# Patient Record
Sex: MALE | Race: WHITE | NOT HISPANIC OR LATINO | Employment: OTHER | URBAN - METROPOLITAN AREA
[De-identification: names, ages, dates, MRNs, and addresses within clinical notes are randomized per-mention and may not be internally consistent; named-entity substitution may affect disease eponyms.]

---

## 2023-12-19 ENCOUNTER — HOSPITAL ENCOUNTER (OUTPATIENT)
Dept: RADIOLOGY | Facility: HOSPITAL | Age: 68
Discharge: HOME | End: 2023-12-19
Payer: MEDICARE

## 2023-12-19 ENCOUNTER — LAB (OUTPATIENT)
Dept: LAB | Facility: LAB | Age: 68
End: 2023-12-19
Payer: MEDICARE

## 2023-12-19 DIAGNOSIS — K21.9 GASTRO-ESOPHAGEAL REFLUX DISEASE WITHOUT ESOPHAGITIS: ICD-10-CM

## 2023-12-19 DIAGNOSIS — E55.9 VITAMIN D DEFICIENCY, UNSPECIFIED: ICD-10-CM

## 2023-12-19 DIAGNOSIS — R05.2 SUBACUTE COUGH: ICD-10-CM

## 2023-12-19 DIAGNOSIS — E78.5 HYPERLIPIDEMIA, UNSPECIFIED: Primary | ICD-10-CM

## 2023-12-19 DIAGNOSIS — Z12.5 ENCOUNTER FOR SCREENING FOR MALIGNANT NEOPLASM OF PROSTATE: ICD-10-CM

## 2023-12-19 DIAGNOSIS — N40.0 BENIGN PROSTATIC HYPERPLASIA WITHOUT LOWER URINARY TRACT SYMPTOMS: ICD-10-CM

## 2023-12-19 LAB
ALBUMIN SERPL BCP-MCNC: 4.5 G/DL (ref 3.4–5)
ALP SERPL-CCNC: 78 U/L (ref 33–136)
ALT SERPL W P-5'-P-CCNC: 37 U/L (ref 10–52)
ANION GAP SERPL CALC-SCNC: 13 MMOL/L (ref 10–20)
APPEARANCE UR: CLEAR
AST SERPL W P-5'-P-CCNC: 25 U/L (ref 9–39)
BASOPHILS # BLD AUTO: 0.08 X10*3/UL (ref 0–0.1)
BASOPHILS NFR BLD AUTO: 0.9 %
BILIRUB DIRECT SERPL-MCNC: 0.1 MG/DL (ref 0–0.3)
BILIRUB SERPL-MCNC: 0.5 MG/DL (ref 0–1.2)
BILIRUB UR STRIP.AUTO-MCNC: NEGATIVE MG/DL
BUN SERPL-MCNC: 14 MG/DL (ref 6–23)
CALCIUM SERPL-MCNC: 9.6 MG/DL (ref 8.6–10.3)
CHLORIDE SERPL-SCNC: 104 MMOL/L (ref 98–107)
CHOLEST SERPL-MCNC: 261 MG/DL (ref 0–199)
CHOLESTEROL/HDL RATIO: 5.1
CO2 SERPL-SCNC: 28 MMOL/L (ref 21–32)
COLOR UR: YELLOW
CREAT SERPL-MCNC: 1.07 MG/DL (ref 0.5–1.3)
EOSINOPHIL # BLD AUTO: 0.08 X10*3/UL (ref 0–0.7)
EOSINOPHIL NFR BLD AUTO: 0.9 %
ERYTHROCYTE [DISTWIDTH] IN BLOOD BY AUTOMATED COUNT: 13 % (ref 11.5–14.5)
GFR SERPL CREATININE-BSD FRML MDRD: 77 ML/MIN/1.73M*2
GLUCOSE SERPL-MCNC: 90 MG/DL (ref 74–99)
GLUCOSE UR STRIP.AUTO-MCNC: NEGATIVE MG/DL
HCT VFR BLD AUTO: 47.8 % (ref 41–52)
HDLC SERPL-MCNC: 50.8 MG/DL
HGB BLD-MCNC: 16.3 G/DL (ref 13.5–17.5)
IMM GRANULOCYTES # BLD AUTO: 0.02 X10*3/UL (ref 0–0.7)
IMM GRANULOCYTES NFR BLD AUTO: 0.2 % (ref 0–0.9)
KETONES UR STRIP.AUTO-MCNC: NEGATIVE MG/DL
LDLC SERPL CALC-MCNC: 157 MG/DL
LEUKOCYTE ESTERASE UR QL STRIP.AUTO: NEGATIVE
LYMPHOCYTES # BLD AUTO: 2.11 X10*3/UL (ref 1.2–4.8)
LYMPHOCYTES NFR BLD AUTO: 23.9 %
MCH RBC QN AUTO: 33.5 PG (ref 26–34)
MCHC RBC AUTO-ENTMCNC: 34.1 G/DL (ref 32–36)
MCV RBC AUTO: 98 FL (ref 80–100)
MONOCYTES # BLD AUTO: 0.52 X10*3/UL (ref 0.1–1)
MONOCYTES NFR BLD AUTO: 5.9 %
NEUTROPHILS # BLD AUTO: 6.01 X10*3/UL (ref 1.2–7.7)
NEUTROPHILS NFR BLD AUTO: 68.2 %
NITRITE UR QL STRIP.AUTO: NEGATIVE
NON HDL CHOLESTEROL: 210 MG/DL (ref 0–149)
NRBC BLD-RTO: 0 /100 WBCS (ref 0–0)
PH UR STRIP.AUTO: 5 [PH]
PLATELET # BLD AUTO: 324 X10*3/UL (ref 150–450)
POTASSIUM SERPL-SCNC: 4.7 MMOL/L (ref 3.5–5.3)
PROT SERPL-MCNC: 7.2 G/DL (ref 6.4–8.2)
PROT UR STRIP.AUTO-MCNC: NEGATIVE MG/DL
RBC # BLD AUTO: 4.87 X10*6/UL (ref 4.5–5.9)
RBC # UR STRIP.AUTO: NEGATIVE /UL
SODIUM SERPL-SCNC: 140 MMOL/L (ref 136–145)
SP GR UR STRIP.AUTO: 1.01
TRIGL SERPL-MCNC: 265 MG/DL (ref 0–149)
TSH SERPL-ACNC: 0.94 MIU/L (ref 0.44–3.98)
UROBILINOGEN UR STRIP.AUTO-MCNC: <2 MG/DL
VLDL: 53 MG/DL (ref 0–40)
WBC # BLD AUTO: 8.8 X10*3/UL (ref 4.4–11.3)

## 2023-12-19 PROCEDURE — 84443 ASSAY THYROID STIM HORMONE: CPT

## 2023-12-19 PROCEDURE — 85025 COMPLETE CBC W/AUTO DIFF WBC: CPT

## 2023-12-19 PROCEDURE — 36415 COLL VENOUS BLD VENIPUNCTURE: CPT

## 2023-12-19 PROCEDURE — 84154 ASSAY OF PSA FREE: CPT

## 2023-12-19 PROCEDURE — 82248 BILIRUBIN DIRECT: CPT

## 2023-12-19 PROCEDURE — 71046 X-RAY EXAM CHEST 2 VIEWS: CPT | Performed by: RADIOLOGY

## 2023-12-19 PROCEDURE — 84153 ASSAY OF PSA TOTAL: CPT

## 2023-12-19 PROCEDURE — 80053 COMPREHEN METABOLIC PANEL: CPT

## 2023-12-19 PROCEDURE — 71046 X-RAY EXAM CHEST 2 VIEWS: CPT

## 2023-12-19 PROCEDURE — 80061 LIPID PANEL: CPT

## 2023-12-19 PROCEDURE — 82306 VITAMIN D 25 HYDROXY: CPT

## 2023-12-19 PROCEDURE — 81003 URINALYSIS AUTO W/O SCOPE: CPT

## 2023-12-19 PROCEDURE — 83036 HEMOGLOBIN GLYCOSYLATED A1C: CPT

## 2023-12-20 LAB
25(OH)D3 SERPL-MCNC: 21 NG/ML (ref 30–100)
EST. AVERAGE GLUCOSE BLD GHB EST-MCNC: 120 MG/DL
HBA1C MFR BLD: 5.8 %

## 2023-12-21 LAB
PSA FREE MFR SERPL: 12 %
PSA FREE SERPL-MCNC: 0.3 NG/ML
PSA SERPL IA-MCNC: 2.5 NG/ML (ref 0–4)

## 2024-01-17 ENCOUNTER — LAB (OUTPATIENT)
Dept: LAB | Facility: LAB | Age: 69
End: 2024-01-17
Payer: MEDICARE

## 2024-01-17 DIAGNOSIS — I10 ESSENTIAL (PRIMARY) HYPERTENSION: Primary | ICD-10-CM

## 2024-01-17 LAB — POTASSIUM SERPL-SCNC: 5.1 MMOL/L (ref 3.5–5.3)

## 2024-01-17 PROCEDURE — 84132 ASSAY OF SERUM POTASSIUM: CPT

## 2024-01-17 PROCEDURE — 36415 COLL VENOUS BLD VENIPUNCTURE: CPT

## 2024-03-25 ENCOUNTER — HOSPITAL ENCOUNTER (OUTPATIENT)
Dept: RADIOLOGY | Facility: CLINIC | Age: 69
Discharge: HOME | End: 2024-03-25
Payer: MEDICARE

## 2024-03-25 DIAGNOSIS — F17.200 NICOTINE DEPENDENCE, UNSPECIFIED, UNCOMPLICATED: ICD-10-CM

## 2024-03-25 PROCEDURE — 71271 CT THORAX LUNG CANCER SCR C-: CPT

## 2024-03-25 PROCEDURE — 71271 CT THORAX LUNG CANCER SCR C-: CPT | Performed by: RADIOLOGY

## 2024-04-12 ENCOUNTER — OFFICE VISIT (OUTPATIENT)
Dept: CARDIOLOGY | Facility: CLINIC | Age: 69
End: 2024-04-12
Payer: MEDICARE

## 2024-04-12 ENCOUNTER — TELEPHONE (OUTPATIENT)
Dept: CARDIOLOGY | Facility: CLINIC | Age: 69
End: 2024-04-12

## 2024-04-12 VITALS
HEIGHT: 67 IN | BODY MASS INDEX: 31.71 KG/M2 | HEART RATE: 97 BPM | OXYGEN SATURATION: 98 % | WEIGHT: 202 LBS | DIASTOLIC BLOOD PRESSURE: 98 MMHG | SYSTOLIC BLOOD PRESSURE: 171 MMHG

## 2024-04-12 DIAGNOSIS — I73.9 CLAUDICATION (CMS-HCC): ICD-10-CM

## 2024-04-12 DIAGNOSIS — I10 PRIMARY HYPERTENSION: Chronic | ICD-10-CM

## 2024-04-12 DIAGNOSIS — I25.10 CORONARY ARTERY DISEASE INVOLVING NATIVE CORONARY ARTERY OF NATIVE HEART WITHOUT ANGINA PECTORIS: Primary | Chronic | ICD-10-CM

## 2024-04-12 DIAGNOSIS — E78.00 HYPERCHOLESTEREMIA: Chronic | ICD-10-CM

## 2024-04-12 PROBLEM — G56.01 CARPAL TUNNEL SYNDROME OF RIGHT WRIST: Status: ACTIVE | Noted: 2024-04-12

## 2024-04-12 PROBLEM — K21.9 GERD (GASTROESOPHAGEAL REFLUX DISEASE): Status: ACTIVE | Noted: 2023-12-19

## 2024-04-12 PROBLEM — F17.200 SMOKER: Status: ACTIVE | Noted: 2023-12-19

## 2024-04-12 PROBLEM — G54.2 LESION OF RIGHT CERVICAL NERVE ROOT: Status: ACTIVE | Noted: 2024-04-12

## 2024-04-12 PROBLEM — K63.5 COLON POLYP: Status: ACTIVE | Noted: 2023-12-19

## 2024-04-12 PROBLEM — R05.2 SUBACUTE COUGH: Status: ACTIVE | Noted: 2023-12-19

## 2024-04-12 PROBLEM — E78.5 HYPERLIPIDEMIA: Status: ACTIVE | Noted: 2023-12-19

## 2024-04-12 PROBLEM — M25.531 RIGHT WRIST PAIN: Status: ACTIVE | Noted: 2024-04-12

## 2024-04-12 PROBLEM — E55.9 VITAMIN D DEFICIENCY: Status: ACTIVE | Noted: 2023-12-19

## 2024-04-12 PROBLEM — N40.0 BENIGN PROSTATIC HYPERPLASIA: Status: ACTIVE | Noted: 2023-12-19

## 2024-04-12 PROCEDURE — 99214 OFFICE O/P EST MOD 30 MIN: CPT | Performed by: INTERNAL MEDICINE

## 2024-04-12 PROCEDURE — 99204 OFFICE O/P NEW MOD 45 MIN: CPT | Performed by: INTERNAL MEDICINE

## 2024-04-12 PROCEDURE — 1159F MED LIST DOCD IN RCRD: CPT | Performed by: INTERNAL MEDICINE

## 2024-04-12 PROCEDURE — 3077F SYST BP >= 140 MM HG: CPT | Performed by: INTERNAL MEDICINE

## 2024-04-12 PROCEDURE — 1160F RVW MEDS BY RX/DR IN RCRD: CPT | Performed by: INTERNAL MEDICINE

## 2024-04-12 PROCEDURE — 93010 ELECTROCARDIOGRAM REPORT: CPT | Performed by: INTERNAL MEDICINE

## 2024-04-12 PROCEDURE — 3080F DIAST BP >= 90 MM HG: CPT | Performed by: INTERNAL MEDICINE

## 2024-04-12 PROCEDURE — 93005 ELECTROCARDIOGRAM TRACING: CPT | Performed by: INTERNAL MEDICINE

## 2024-04-12 RX ORDER — LANOLIN ALCOHOL/MO/W.PET/CERES
1000 CREAM (GRAM) TOPICAL DAILY
COMMUNITY

## 2024-04-12 RX ORDER — NAPROXEN 500 MG/1
500 TABLET ORAL
COMMUNITY
Start: 2024-04-11

## 2024-04-12 RX ORDER — ATORVASTATIN CALCIUM 40 MG/1
40 TABLET, FILM COATED ORAL DAILY
COMMUNITY
End: 2024-04-17 | Stop reason: SDUPTHER

## 2024-04-12 RX ORDER — HYDROCHLOROTHIAZIDE 25 MG/1
25 TABLET ORAL
COMMUNITY
Start: 2024-01-17

## 2024-04-12 RX ORDER — LOSARTAN POTASSIUM 50 MG/1
50 TABLET ORAL DAILY
COMMUNITY

## 2024-04-12 RX ORDER — AMLODIPINE BESYLATE 10 MG/1
10 TABLET ORAL
COMMUNITY
Start: 2024-01-17

## 2024-04-12 RX ORDER — ROSUVASTATIN CALCIUM 20 MG/1
20 TABLET, COATED ORAL
COMMUNITY
Start: 2024-03-18 | End: 2024-04-12

## 2024-04-12 RX ORDER — PANTOPRAZOLE SODIUM 20 MG/1
20 TABLET, DELAYED RELEASE ORAL DAILY
COMMUNITY
Start: 2024-02-15

## 2024-04-12 RX ORDER — ASPIRIN 81 MG/1
81 TABLET ORAL
COMMUNITY

## 2024-04-12 RX ORDER — ACETAMINOPHEN 500 MG
2000 TABLET ORAL
COMMUNITY
Start: 2012-11-20

## 2024-04-12 RX ORDER — LOSARTAN POTASSIUM 25 MG/1
50 TABLET ORAL
COMMUNITY
Start: 2024-03-19 | End: 2024-04-12 | Stop reason: ALTCHOICE

## 2024-04-12 NOTE — PROGRESS NOTES
Referred by No ref. provider found    HPI I am seeing the patient for evaluation of hypertension and coronary disease.  He is 69.  He speaks predominantly Polish but understands without any difficulty.  He works in construction.  He has no symptoms of chest pain or pressure no significant shortness of breath.  He does smoke and is down to 2 cigarettes a day.  Blood pressures have been quite high.  He went to the emergency room sometime ago because he was drinking very heavily with his friend and his blood pressure was 200.  His blood pressure is now in the morning or in the 120s but by the evening when he comes back from work it is in the 150s.  No palpitations no dizziness no lightheadedness.  He did have a calcium score that came back elevated at 46 units.  His echo from 3/22/2024 revealed normal LV function.    Past Medical History:  Problem List Items Addressed This Visit    None       No past medical history on file.          Past Surgical History:  He has no past surgical history on file.      Social History:  He has no history on file for tobacco use, alcohol use, and drug use.    Family History:  No family history on file.     Allergies:  Patient has no allergy information on record.    Outpatient Medications:  No current outpatient medications     Last Recorded Vitals:  There were no vitals filed for this visit.    Physical Exam    Physical  Patient is alert and oriented x3.  HEENT is unremarkable mucous members are moist  Neck no JVP no bruits upstrokes are full no thyromegaly  Lungs are clear bilaterally.  No wheezing crackles or rales  Heart regular rhythm normal S1-S2 there is no S3 no murmurs are heard.  Abdomen is soft vessels are positive nontender nondistended no organomegaly no pulsatile masses  Extremities have no edema.  Distal pulses present palpable.  Neuro is grossly nonfocal  Skin has no rashes     Last Labs:  CBC -  Lab Results   Component Value Date    WBC 8.8 12/19/2023    HGB 16.3  12/19/2023    HCT 47.8 12/19/2023    MCV 98 12/19/2023     12/19/2023       CMP -  Lab Results   Component Value Date    CALCIUM 9.6 12/19/2023    PROT 7.2 12/19/2023    ALBUMIN 4.5 12/19/2023    AST 25 12/19/2023    ALT 37 12/19/2023    ALKPHOS 78 12/19/2023    BILITOT 0.5 12/19/2023       LIPID PANEL -   Lab Results   Component Value Date    CHOL 261 (H) 12/19/2023    HDL 50.8 12/19/2023    CHHDL 5.1 12/19/2023    VLDL 53 (H) 12/19/2023    TRIG 265 (H) 12/19/2023    NHDL 210 (H) 12/19/2023       RENAL FUNCTION PANEL -   Lab Results   Component Value Date    K 5.1 01/17/2024       Lab Results   Component Value Date    HGBA1C 5.8 (H) 12/19/2023    HGBA1C 5.8 (H) 12/19/2023          Procedures    Screening CT 3/25/2024 emphysema CAC 48 units    Echo 3/22/2024 [facility] LVH, EF 60%.    Assessment/Plan   1.  CAD.  Elevated CAC 48 units.  Continue statins continue aspirin.    2.  Hyperlipidemia.  12/19/2023  HDL 51 triglycerides 265.  He stopped taking rosuvastatin but then went back on it.  He is develops muscle aches and pains.  I will stop it and put him on atorvastatin 40 mg.  Labs will be followed by his primary care doctor.  Target LDL less than 70    3.  Claudication.  He does not have exertional claudication but rather has muscle aches and pains when he is on statins.  I will stop rosuvastatin and place him on atorvastatin 40.    4. HTN- On losartan and amlodipine and hydrochlorothiazide.BP Very high today.  Blood pressures in the morning are good in the evening they are high.  I will have him take his hydrochlorothiazide and amlodipine in the morning.  I will increase his losartan to 50 mg and have him take the dose in the mid afternoon to help combat the higher evening blood pressures.    EKG today.  EMANUEL visit 6 weeks to follow his blood pressure and see how he is doing with the atorvastatin.  See me back 6 months    Madie Liang MD     Instructions and follow up

## 2024-04-12 NOTE — PATIENT INSTRUCTIONS
1.  CAD.  Elevated CAC 48 units.  Continue statins continue aspirin.    2.  Hyperlipidemia.  12/19/2023  HDL 51 triglycerides 265.  He stopped taking rosuvastatin but then went back on it.  He is develops muscle aches and pains.  I will stop it and put him on atorvastatin 40 mg.  Labs will be followed by his primary care doctor.  Target LDL less than 70    3.  Claudication.  He does not have exertional claudication but rather has muscle aches and pains when he is on statins.  I will stop rosuvastatin and place him on atorvastatin 40.    4. HTN- On losartan and amlodipine and hydrochlorothiazide.BP Very high today.  Blood pressures in the morning are good in the evening they are high.  I will have him take his hydrochlorothiazide and amlodipine in the morning.  I will increase his losartan to 50 mg and have him take the dose in the mid afternoon to help combat the higher evening blood pressures.    EKG today.  EMANUEL visit 6 weeks to follow his blood pressure and see how he is doing with the atorvastatin.  See me back 6 months

## 2024-04-12 NOTE — TELEPHONE ENCOUNTER
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  Can you please send in this patient's ATORVASTATIN 40 MG at bedtime.    And he increased his losartan from 25 mg to 50 Mg.  Can you send this one in too. 1 50 Mg tablet at breakfast.    [facility] on File.    Thank you  Chandrika

## 2024-04-17 DIAGNOSIS — I25.10 CORONARY ARTERY DISEASE INVOLVING NATIVE CORONARY ARTERY OF NATIVE HEART WITHOUT ANGINA PECTORIS: Primary | ICD-10-CM

## 2024-04-17 RX ORDER — ATORVASTATIN CALCIUM 40 MG/1
40 TABLET, FILM COATED ORAL DAILY
Qty: 90 TABLET | Refills: 3 | Status: SHIPPED | OUTPATIENT
Start: 2024-04-17 | End: 2024-06-05 | Stop reason: SDUPTHER

## 2024-05-02 VITALS — BODY MASS INDEX: 31.39 KG/M2 | WEIGHT: 200 LBS | HEIGHT: 67 IN

## 2024-05-02 NOTE — ADDENDUM NOTE
Encounter addended by: Jaylen Zepeda on: 5/2/2024 2:18 PM   Actions taken: Imaging Exam ended, Flowsheet accepted

## 2024-05-16 PROBLEM — R93.1 ELEVATED CORONARY ARTERY CALCIUM SCORE: Status: ACTIVE | Noted: 2024-04-03

## 2024-05-16 PROBLEM — M17.10 ARTHRITIS OF KNEE: Status: ACTIVE | Noted: 2024-04-11

## 2024-05-16 PROBLEM — M25.461 EFFUSION OF RIGHT KNEE: Status: ACTIVE | Noted: 2024-04-11

## 2024-05-28 ENCOUNTER — APPOINTMENT (OUTPATIENT)
Dept: CARDIOLOGY | Facility: CLINIC | Age: 69
End: 2024-05-28
Payer: MEDICARE

## 2024-06-05 ENCOUNTER — OFFICE VISIT (OUTPATIENT)
Dept: CARDIOLOGY | Facility: CLINIC | Age: 69
End: 2024-06-05
Payer: MEDICARE

## 2024-06-05 VITALS
DIASTOLIC BLOOD PRESSURE: 81 MMHG | SYSTOLIC BLOOD PRESSURE: 144 MMHG | WEIGHT: 202 LBS | HEART RATE: 88 BPM | OXYGEN SATURATION: 98 % | HEIGHT: 68 IN | BODY MASS INDEX: 30.62 KG/M2

## 2024-06-05 DIAGNOSIS — I73.9 CLAUDICATION (CMS-HCC): ICD-10-CM

## 2024-06-05 DIAGNOSIS — I10 PRIMARY HYPERTENSION: Primary | Chronic | ICD-10-CM

## 2024-06-05 DIAGNOSIS — I25.10 CORONARY ARTERY DISEASE INVOLVING NATIVE CORONARY ARTERY OF NATIVE HEART WITHOUT ANGINA PECTORIS: ICD-10-CM

## 2024-06-05 PROCEDURE — 1159F MED LIST DOCD IN RCRD: CPT | Performed by: NURSE PRACTITIONER

## 2024-06-05 PROCEDURE — 99212 OFFICE O/P EST SF 10 MIN: CPT | Performed by: NURSE PRACTITIONER

## 2024-06-05 PROCEDURE — 3077F SYST BP >= 140 MM HG: CPT | Performed by: NURSE PRACTITIONER

## 2024-06-05 PROCEDURE — 3079F DIAST BP 80-89 MM HG: CPT | Performed by: NURSE PRACTITIONER

## 2024-06-05 RX ORDER — ATORVASTATIN CALCIUM 40 MG/1
40 TABLET, FILM COATED ORAL DAILY
Qty: 90 TABLET | Refills: 3 | Status: SHIPPED | OUTPATIENT
Start: 2024-06-05

## 2024-06-05 NOTE — PROGRESS NOTES
Ariana Bill is a 69 y.o. male     History Of Present Illness   Mr Bill is a 69 year old male with uncontrolled hypertension.  At his first visit with Dr Liang, his losartan was increased to 50mg and was instructed to take this in the mid afternoon and take his hydrochlorothiazide and amlodipine in  the morning.  His rosuvastatin was stopped due to myalgia and switched to atorvastatin 40mg.     Social HX  Social History     Tobacco Use    Smoking status: Every Day     Current packs/day: 0.25     Types: Cigarettes    Smokeless tobacco: Never          Family HX  Family History   Problem Relation Name Age of Onset    Heart attack Father  70          Review Of Systems   Constitutional: not feeling tired.   Eyes: no eyesight problems.  No vision loss or change in vision  ENT: no hearing loss and no nosebleeds.   Cardiovascular: +leg pain   No chest pain, no tightness or heavy pressure  No shortness of breath,  No palpitations,  No lower extremity edema  The heart rate is regular  Respiratory: no chronic cough and no shortness of breath.   Gastrointestinal: no change in bowel habits and no blood in stools.   Genitourinary: no urinary frequency.   Skin: no skin rashes.   Neurological: No frequent falls.   No dizziness  No weakness  Denies headaches  Psychiatric: no depression and not suicidal.   All other systems have been reviewed and are negative for complaint.        Allergies  No Known Allergies       Vitals  There were no vitals taken for this visit.        Physical Exam  Constitutional: alert and in no acute distress.   Eyes: no erythema, swelling or discharge from the eye .   Neck: neck is supple, symmetric, trachea midline, no masses  and no thyromegaly .   Pulmonary: No increased work of breathing or signs of respiratory distress    Lungs clear to auscultation.    No friction rub.  Cardiovascular: carotid pulses 2+ bilaterally with no bruit    JVP was normal, no thrills ,   Regular rhythm, normal S1 and S2,  no murmurs    Pedal pulses 2+ bilaterally    No edema .   Abdomen: abdomen non-tender, no masses  and no hepatomegaly . No pulsatile mass noted  Skin: skin warm and dry, normal skin turgor .   Psychiatric judgment and insight is normal  and oriented to person, place and time .           Current/Home Meds    Current Outpatient Medications:     amLODIPine (Norvasc) 10 mg tablet, Take 1 tablet (10 mg) by mouth once daily in the morning. Take before meals., Disp: , Rfl:     aspirin 81 mg EC tablet, Take 1 tablet (81 mg) by mouth once daily., Disp: , Rfl:     atorvastatin (Lipitor) 40 mg tablet, Take 1 tablet (40 mg) by mouth once daily., Disp: 90 tablet, Rfl: 3    cholecalciferol (Vitamin D-3) 50 mcg (2,000 unit) capsule, Take 1 capsule (50 mcg) by mouth once daily., Disp: , Rfl:     cyanocobalamin (Vitamin B-12) 1,000 mcg tablet, Take 1 tablet (1,000 mcg) by mouth once daily., Disp: , Rfl:     hydroCHLOROthiazide (HYDRODiuril) 25 mg tablet, Take 1 tablet (25 mg) by mouth once daily in the morning. Take before meals., Disp: , Rfl:     losartan (Cozaar) 50 mg tablet, Take 1 tablet (50 mg) by mouth once daily., Disp: , Rfl:     naproxen (Naprosyn) 500 mg tablet, Take 1 tablet (500 mg) by mouth 2 times a day with meals., Disp: , Rfl:     pantoprazole (ProtoNix) 20 mg EC tablet, Take 1 tablet (20 mg) by mouth once daily., Disp: , Rfl:              Assessment/Plan      UNCONTROLLED HYPERTENSION:  His BP today is 144/81.    Today, the patient admits he did not know that he was started on a new statin.  I placed another order with his pharmacy and he will pick this up.  He continues to complain of leg pain and is scheduled to see Dr Jansen on June 16, 2024.  He reports that his BP is 120/80 in the am and by the late evening, his BP is 140-145/80-90.  He did not adjust the times of his BP medications as directed by Dr Liang.  I have provided the patient with a medication list with times to take this medicine. He will follow up  with Dr Liang as directed, however I have instructed the patient to call me if he has any questions about the dose or time of his medications or if he needs any refills until his follow up.

## 2024-06-19 ENCOUNTER — APPOINTMENT (OUTPATIENT)
Dept: VASCULAR SURGERY | Facility: CLINIC | Age: 69
End: 2024-06-19
Payer: MEDICARE

## 2024-06-19 VITALS
HEART RATE: 83 BPM | BODY MASS INDEX: 31.37 KG/M2 | HEIGHT: 68 IN | DIASTOLIC BLOOD PRESSURE: 88 MMHG | WEIGHT: 207 LBS | SYSTOLIC BLOOD PRESSURE: 138 MMHG | OXYGEN SATURATION: 99 %

## 2024-06-19 DIAGNOSIS — I73.9 CLAUDICATION (CMS-HCC): ICD-10-CM

## 2024-06-19 PROCEDURE — 3075F SYST BP GE 130 - 139MM HG: CPT | Performed by: SURGERY

## 2024-06-19 PROCEDURE — 3079F DIAST BP 80-89 MM HG: CPT | Performed by: SURGERY

## 2024-06-19 PROCEDURE — 99203 OFFICE O/P NEW LOW 30 MIN: CPT | Performed by: SURGERY

## 2024-06-19 PROCEDURE — 1159F MED LIST DOCD IN RCRD: CPT | Performed by: SURGERY

## 2024-06-19 NOTE — PROGRESS NOTES
"Ariana Bill is a 69 y.o. male     Subjective   This patient presents today as a new consult for  bilateral calf pain with ambulation.  He is to smoke a pack a day.  He is now smoking 2 to 3 cigarettes/week.  He does have a medical history of hypertension coronary artery disease and claudication.  He is 5 foot 8 and weighs 207 pounds.  He is a retired .  He is currently 69 years old.  He states that he does walk his dog every day about a mile and a half.  He does get bilateral calf pain especially as the day goes on.  He does not complain of any weakness of his legs.  He denies any fever chills nausea vomiting or headache         Objective     Vitals:    06/19/24 0700   BP: 138/88   Pulse: 83   SpO2: 99%      Physical Exam  This patient is alert and oriented x3.  No acute distress.  Head is normocephalic.  Pupils are equal and reactive to light and accommodation.  Neck is soft and supple without palpable lymph nodes.  Heart is regular rate.  Lungs are clear.  Abdomen is soft with positive bowel sounds there is no pain on palpation.  Upper and lower extremities have adequate range of motion with palpable brachial radial femoral and popliteal pulses.  Skin turgor is adequate.  Psychologically the patient appears to be acting appropriately.  He does have palpable dorsalis pedis pulses bilaterally right greater than left  Blood pressure 138/88, pulse 83, height 1.727 m (5' 8\"), weight 93.9 kg (207 lb), SpO2 99%.            Patient Active Problem List    Diagnosis Date Noted    Coronary artery disease involving native coronary artery of native heart without angina pectoris 04/12/2024    Claudication (CMS-[facility]) 04/12/2024    Right wrist pain 04/12/2024    Lesion of right cervical nerve root 04/12/2024    Carpal tunnel syndrome of right wrist 04/12/2024    Effusion of right knee 04/11/2024    Arthritis of knee 04/11/2024    Elevated coronary artery calcium score 04/03/2024    Hypertension 01/11/2024    " Hyperlipidemia 12/19/2023    GERD (gastroesophageal reflux disease) 12/19/2023    Colon polyp 12/19/2023    Smoker 12/19/2023    Subacute cough 12/19/2023    Benign prostatic hyperplasia 12/19/2023    Vitamin D deficiency 12/19/2023    Hodgkin's disease (Multi) 02/28/2005          Current Outpatient Medications:     amLODIPine (Norvasc) 10 mg tablet, Take 1 tablet (10 mg) by mouth once daily in the morning. Take before meals., Disp: , Rfl:     aspirin 81 mg EC tablet, Take 1 tablet (81 mg) by mouth once daily., Disp: , Rfl:     atorvastatin (Lipitor) 40 mg tablet, Take 1 tablet (40 mg) by mouth once daily., Disp: 90 tablet, Rfl: 3    cholecalciferol (Vitamin D-3) 50 mcg (2,000 unit) capsule, Take 1 capsule (50 mcg) by mouth once daily., Disp: , Rfl:     cyanocobalamin (Vitamin B-12) 1,000 mcg tablet, Take 1 tablet (1,000 mcg) by mouth once daily., Disp: , Rfl:     hydroCHLOROthiazide (HYDRODiuril) 25 mg tablet, Take 1 tablet (25 mg) by mouth once daily in the morning. Take before meals., Disp: , Rfl:     losartan (Cozaar) 50 mg tablet, Take 1 tablet (50 mg) by mouth once daily., Disp: , Rfl:     naproxen (Naprosyn) 500 mg tablet, Take 1 tablet (500 mg) by mouth 2 times daily (morning and late afternoon)., Disp: , Rfl:     pantoprazole (ProtoNix) 20 mg EC tablet, Take 1 tablet (20 mg) by mouth once daily., Disp: , Rfl:      Lab Results   Component Value Date    WBC 8.8 12/19/2023    HGB 16.3 12/19/2023    HCT 47.8 12/19/2023     12/19/2023    CHOL 261 (H) 12/19/2023    TRIG 265 (H) 12/19/2023    HDL 50.8 12/19/2023    ALT 37 12/19/2023    AST 25 12/19/2023     12/19/2023    K 5.1 01/17/2024     12/19/2023    CREATININE 1.07 12/19/2023    BUN 14 12/19/2023    CO2 28 12/19/2023    TSH 0.94 12/19/2023    PSA 2.5 12/19/2023    HGBA1C 5.8 (H) 12/19/2023    HGBA1C 5.8 (H) 12/19/2023       CT lung screening low dose    Result Date: 3/25/2024  Interpreted By:  Jonathan Byrd, STUDY: CT LUNG SCREENING LOW  DOSE; 3/25/2024 3:20 pm   INDICATION: Signs/Symptoms:SMOKER, CANCER LUNG SCREENING.   COMPARISON: None.   ACCESSION NUMBER(S): [id number]   ORDERING CLINICIAN: CARMEN DE LA O   TECHNIQUE: Helical data acquisition of the chest was obtained without IV contrast material.  Images were reformatted in axial, coronal, and sagittal planes.   FINDINGS: LUNGS AND AIRWAYS: The trachea and central airways are patent. No endobronchial lesion is seen.   There is mild bilateral upper lung predominant centrilobular and paraseptal emphysema.Diffuse subpleural reticulation is nonspecific, likely on the basis of smoking related interstitial changes.There is no focal consolidation, pleural effusion, or pneumothorax.   There are few bilateral noncalcified pulmonary nodules seen. For example,there is a 3 mm nodule in the left upper lobe on series 4, image 76; 4 mm nodule in the left lower lobe on image 170; and 4 mm right nodule in the right lower lobe on image 209.   MEDIASTINUM AND TEE, LOWER NECK AND AXILLA: The visualized thyroid gland is within normal limits. No evidence of thoracic lymphadenopathy by CT criteria. There is a smallsized hiatal hernia. Otherwise, the esophagus is within normal limits.   HEART AND VESSELS: The thoracic aorta normal in course and caliber.There is mild scattered calcified atherosclerosis present. Main pulmonary artery and its branches are normal in caliber. Estimated coronary artery calcium score is 48. The cardiac chambers are not enlarged.There are mild mitral annular and aortic valve calcifications seen. Correlate with cardiovascular risk factors and patient's symptoms. There is no pericardial effusion seen.   UPPER ABDOMEN: The liver is diffusely hypoattenuating with respect to the spleen.       CHEST WALL AND OSSEOUS STRUCTURES: Status post median sternotomy. No acute osseous pathology.There are no suspicious osseous lesions.       1.  Few small bilateral noncalcified pulmonary nodules  measuring up to 4 mm, likely benign. Continued screening with low-dose noncontrast chest CT in 12 months (from current date) is recommended. 2. Estimated coronary artery calcium score is 48*. This patient is at mildly increased risk for future cardiovascular events such as myocardial infarction, ischemic stroke, and cardiovascular death. Consider addressing modifiable cardiovascular risk factors and initiating preventive therapies such as a statin or low-dose aspirin per the most recent guidelines for primary prevention (e.g. https://doi.org/10.1161/CIR.0097345543527068). If further assistance is required, consider referral to the [facility] Cardiovascular Prevention Program ( Preventive Cardiology): Place EPIC referral to 'Cardiology Prevention Program. 3. Mild apical emphysema. 4. Mild mitral annular and aortic valve calcifications. Correlate with cardiovascular risk factors and patient's symptoms. 5. Hepatic steatosis. Correlate with liver function tests.     LUNG RADS CATEGORY: Lung Rad: Lung-RADS 2 (Benign Appearance or Indolent Behavior)   Recommendation: Continue annual screening with Low Dose Chest CT in 12 months, recommended as per American College of Radiology Guidelines Lung-RADS Version 2022.       *The patient's coronary artery calcium (CAC) score was measured using an FDA-cleared AI tool that highly correlates with the traditional CAC measurements. However, the above estimate may subject to the limitations of the non-gated CT scan and the novel nature of the algorithm. Therefore, AI-powered CAC score estimation should not be used as a replacement for traditional cardiovascular risk factor assessment. If further assistance is required, consider referral to the [facility] Cardiovascular Prevention Program ( Preventive Cardiology): Place EPIC referral to 'Cardiology Prevention Program.'   MACRO: None   Signed by: Jonathan Byrd 3/25/2024 5:15 PM Dictation workstation:   [id number]    CT lung  screening follow up CT chest wo IV contrast    Result Date: 3/25/2024  Interpreted By:  Jonathan Byrd, STUDY: CT LUNG SCREENING LOW DOSE; 3/25/2024 3:20 pm    INDICATION: Signs/Symptoms:SMOKER, CANCER LUNG SCREENING.    COMPARISON: None.    ACCESSION NUMBER(S): [id number]    ORDERING CLINICIAN: CARMEN DE LA O    TECHNIQUE: Helical data acquisition of the chest was obtained without IV contrast material.  Images were reformatted in axial, coronal, and sagittal planes.    FINDINGS: LUNGS AND AIRWAYS: The trachea and central airways are patent. No endobronchial lesion is seen.    There is mild bilateral upper lung predominant centrilobular and paraseptal emphysema.Diffuse subpleural reticulation is nonspecific, likely on the basis of smoking related interstitial changes.There is no focal consolidation, pleural effusion, or pneumothorax.    There are few bilateral noncalcified pulmonary nodules seen. For example,there is a 3 mm nodule in the left upper lobe on series 4, image 76; 4 mm nodule in the left lower lobe on image 170; and 4 mm right nodule in the right lower lobe on image 209.    MEDIASTINUM AND TEE, LOWER NECK AND AXILLA: The visualized thyroid gland is within normal limits. No evidence of thoracic lymphadenopathy by CT criteria. There is a smallsized hiatal hernia. Otherwise, the esophagus is within normal limits.    HEART AND VESSELS: The thoracic aorta normal in course and caliber.There is mild scattered calcified atherosclerosis present. Main pulmonary artery and its branches are normal in caliber. Estimated coronary artery calcium score is 48. The cardiac chambers are not enlarged.There are mild mitral annular and aortic valve calcifications seen. Correlate with cardiovascular risk factors and patient's symptoms. There is no pericardial effusion seen.    UPPER ABDOMEN: The liver is diffusely hypoattenuating with respect to the spleen.          CHEST WALL AND OSSEOUS STRUCTURES: Status post  median sternotomy. No acute osseous pathology.There are no suspicious osseous lesions.    IMPRESSION: 1.  Few small bilateral noncalcified pulmonary nodules measuring up to 4 mm, likely benign. Continued screening with low-dose noncontrast chest CT in 12 months (from current date) is recommended. 2. Estimated coronary artery calcium score is 48*. This patient is at mildly increased risk for future cardiovascular events such as myocardial infarction, ischemic stroke, and cardiovascular death. Consider addressing modifiable cardiovascular risk factors and initiating preventive therapies such as a statin or low-dose aspirin per the most recent guidelines for primary prevention (e.g. https://doi.org/10.1161/CIR.9850742404401772). If further assistance is required, consider referral to the [facility] Cardiovascular Prevention Program ([facility]iology): Place EPIC referral to Cardiology Prevention Program. 3. Mild apical emphysema. 4. Mild mitral annular and aortic valve calcifications. Correlate with cardiovascular risk factors and patient's symptoms. 5. Hepatic steatosis. Correlate with liver function tests.       LUNG RADS CATEGORY: Lung Rad: Lung-RADS 2 (Benign Appearance or Indolent Behavior)    Recommendation: Continue annual screening with Low Dose Chest CT in 12 months, recommended as per American College of Radiology Guidelines Lung-RADS Version 2022.          *The patient's coronary artery calcium (CAC) score was measured using an FDA-cleared AI tool that highly correlates with the traditional CAC measurements. However, the above estimate may subject to the limitations of the non-gated CT scan and the novel nature of the algorithm. Therefore, AI-powered CAC score estimation should not be used as a replacement for traditional cardiovascular risk factor assessment. If further assistance is required, consider referral to the [facility] Cardiovascular Prevention Program ([facility] Cardiology):  Place EPIC referral to 'Cardiology Prevention Program.'    MACRO: None    Signed by: Jonathan Byrd 3/25/2024 5:15 PM Dictation workstation:   [id number]                Assessment/Plan   Active Problems:  There are no active Hospital Problems.      This patient presents today as new consult for evaluation of peripheral arterial disease and claudication bilateral lower extremities.  He states that both  calves hurt him equally after walking certain distances.  He does state that it takes about an hour for his legs to calm down after they start hurting.  He does have palpable femoral popliteal and pedal pulses bilaterally.  My suspicion for claudication secondary to arterial insufficiency is relatively low at this time.  However, I would like to obtain ABIs with exercise to see if his pressure drops in his legs after 1 minute of exercise on the treadmill.  I would like him to follow-up in 4 to 6 weeks.  I am encouraging him to continue to exercise his legs.  Thank you very much for allow me to take part in the care of your patient.  Sincerely years, Dr. Kong Jansen, DO

## 2024-07-10 ENCOUNTER — HOSPITAL ENCOUNTER (OUTPATIENT)
Dept: VASCULAR MEDICINE | Facility: CLINIC | Age: 69
Discharge: HOME | End: 2024-07-10
Payer: MEDICARE

## 2024-07-10 DIAGNOSIS — I73.9 CLAUDICATION (CMS-HCC): ICD-10-CM

## 2024-07-10 PROCEDURE — 93924 LWR XTR VASC STDY BILAT: CPT

## 2024-07-10 PROCEDURE — 93924 LWR XTR VASC STDY BILAT: CPT | Performed by: INTERNAL MEDICINE

## 2024-08-01 ENCOUNTER — APPOINTMENT (OUTPATIENT)
Dept: VASCULAR SURGERY | Facility: CLINIC | Age: 69
End: 2024-08-01
Payer: MEDICARE

## 2024-08-01 VITALS
DIASTOLIC BLOOD PRESSURE: 70 MMHG | HEIGHT: 68 IN | WEIGHT: 207 LBS | OXYGEN SATURATION: 98 % | SYSTOLIC BLOOD PRESSURE: 150 MMHG | BODY MASS INDEX: 31.37 KG/M2 | HEART RATE: 89 BPM

## 2024-08-01 DIAGNOSIS — I73.9 CLAUDICATION (CMS-HCC): Primary | ICD-10-CM

## 2024-08-01 PROCEDURE — 3078F DIAST BP <80 MM HG: CPT | Performed by: SURGERY

## 2024-08-01 PROCEDURE — 99213 OFFICE O/P EST LOW 20 MIN: CPT | Performed by: SURGERY

## 2024-08-01 PROCEDURE — 3008F BODY MASS INDEX DOCD: CPT | Performed by: SURGERY

## 2024-08-01 PROCEDURE — 1159F MED LIST DOCD IN RCRD: CPT | Performed by: SURGERY

## 2024-08-01 PROCEDURE — 3077F SYST BP >= 140 MM HG: CPT | Performed by: SURGERY

## 2024-08-01 PROCEDURE — 1160F RVW MEDS BY RX/DR IN RCRD: CPT | Performed by: SURGERY

## 2024-08-21 ENCOUNTER — LAB (OUTPATIENT)
Dept: LAB | Facility: LAB | Age: 69
End: 2024-08-21
Payer: MEDICARE

## 2024-08-21 DIAGNOSIS — Z12.5 ENCOUNTER FOR SCREENING FOR MALIGNANT NEOPLASM OF PROSTATE: ICD-10-CM

## 2024-08-21 DIAGNOSIS — E78.2 MIXED HYPERLIPIDEMIA: ICD-10-CM

## 2024-08-21 DIAGNOSIS — E55.9 VITAMIN D DEFICIENCY, UNSPECIFIED: ICD-10-CM

## 2024-08-21 DIAGNOSIS — I10 ESSENTIAL (PRIMARY) HYPERTENSION: ICD-10-CM

## 2024-08-21 DIAGNOSIS — E53.8 DEFICIENCY OF OTHER SPECIFIED B GROUP VITAMINS: ICD-10-CM

## 2024-08-21 DIAGNOSIS — M79.10 MYALGIA, UNSPECIFIED SITE: Primary | ICD-10-CM

## 2024-08-21 LAB
ALBUMIN SERPL BCP-MCNC: 4.1 G/DL (ref 3.4–5)
ALP SERPL-CCNC: 59 U/L (ref 33–136)
ALT SERPL W P-5'-P-CCNC: 31 U/L (ref 10–52)
ANION GAP SERPL CALC-SCNC: 12 MMOL/L (ref 10–20)
AST SERPL W P-5'-P-CCNC: 24 U/L (ref 9–39)
BASOPHILS # BLD AUTO: 0.05 X10*3/UL (ref 0–0.1)
BASOPHILS NFR BLD AUTO: 0.9 %
BILIRUB DIRECT SERPL-MCNC: 0.2 MG/DL (ref 0–0.3)
BILIRUB SERPL-MCNC: 1.2 MG/DL (ref 0–1.2)
BUN SERPL-MCNC: 16 MG/DL (ref 6–23)
CALCIUM SERPL-MCNC: 9.5 MG/DL (ref 8.6–10.3)
CHLORIDE SERPL-SCNC: 105 MMOL/L (ref 98–107)
CHOLEST SERPL-MCNC: 254 MG/DL (ref 0–199)
CHOLESTEROL/HDL RATIO: 4.4
CO2 SERPL-SCNC: 26 MMOL/L (ref 21–32)
CREAT SERPL-MCNC: 0.96 MG/DL (ref 0.5–1.3)
CRP SERPL HS-MCNC: 38.6 MG/L
EGFRCR SERPLBLD CKD-EPI 2021: 87 ML/MIN/1.73M*2
EOSINOPHIL # BLD AUTO: 0.19 X10*3/UL (ref 0–0.7)
EOSINOPHIL NFR BLD AUTO: 3.5 %
ERYTHROCYTE [DISTWIDTH] IN BLOOD BY AUTOMATED COUNT: 14.1 % (ref 11.5–14.5)
GLUCOSE SERPL-MCNC: 129 MG/DL (ref 74–99)
HCT VFR BLD AUTO: 45.9 % (ref 41–52)
HDLC SERPL-MCNC: 58.2 MG/DL
HGB BLD-MCNC: 15.6 G/DL (ref 13.5–17.5)
IMM GRANULOCYTES # BLD AUTO: 0.01 X10*3/UL (ref 0–0.7)
IMM GRANULOCYTES NFR BLD AUTO: 0.2 % (ref 0–0.9)
LDLC SERPL CALC-MCNC: 175 MG/DL
LYMPHOCYTES # BLD AUTO: 2.29 X10*3/UL (ref 1.2–4.8)
LYMPHOCYTES NFR BLD AUTO: 41.7 %
MCH RBC QN AUTO: 32.1 PG (ref 26–34)
MCHC RBC AUTO-ENTMCNC: 34 G/DL (ref 32–36)
MCV RBC AUTO: 94 FL (ref 80–100)
MONOCYTES # BLD AUTO: 0.43 X10*3/UL (ref 0.1–1)
MONOCYTES NFR BLD AUTO: 7.8 %
NEUTROPHILS # BLD AUTO: 2.52 X10*3/UL (ref 1.2–7.7)
NEUTROPHILS NFR BLD AUTO: 45.9 %
NON HDL CHOLESTEROL: 196 MG/DL (ref 0–149)
NRBC BLD-RTO: 0 /100 WBCS (ref 0–0)
PLATELET # BLD AUTO: 240 X10*3/UL (ref 150–450)
POTASSIUM SERPL-SCNC: 4 MMOL/L (ref 3.5–5.3)
PROT SERPL-MCNC: 7 G/DL (ref 6.4–8.2)
RBC # BLD AUTO: 4.86 X10*6/UL (ref 4.5–5.9)
SODIUM SERPL-SCNC: 139 MMOL/L (ref 136–145)
TRIGL SERPL-MCNC: 106 MG/DL (ref 0–149)
TSH SERPL-ACNC: 0.93 MIU/L (ref 0.44–3.98)
VIT B12 SERPL-MCNC: 471 PG/ML (ref 211–911)
VLDL: 21 MG/DL (ref 0–40)
WBC # BLD AUTO: 5.5 X10*3/UL (ref 4.4–11.3)

## 2024-08-21 PROCEDURE — 80053 COMPREHEN METABOLIC PANEL: CPT

## 2024-08-21 PROCEDURE — 36415 COLL VENOUS BLD VENIPUNCTURE: CPT

## 2024-08-21 PROCEDURE — 80061 LIPID PANEL: CPT

## 2024-08-21 PROCEDURE — 82248 BILIRUBIN DIRECT: CPT

## 2024-08-21 PROCEDURE — 83036 HEMOGLOBIN GLYCOSYLATED A1C: CPT

## 2024-08-21 PROCEDURE — G0103 PSA SCREENING: HCPCS

## 2024-08-21 PROCEDURE — 84154 ASSAY OF PSA FREE: CPT

## 2024-08-21 PROCEDURE — 84443 ASSAY THYROID STIM HORMONE: CPT

## 2024-08-21 PROCEDURE — 82607 VITAMIN B-12: CPT

## 2024-08-21 PROCEDURE — 86141 C-REACTIVE PROTEIN HS: CPT

## 2024-08-21 PROCEDURE — 85025 COMPLETE CBC W/AUTO DIFF WBC: CPT

## 2024-08-21 PROCEDURE — 82652 VIT D 1 25-DIHYDROXY: CPT

## 2024-08-22 LAB
EST. AVERAGE GLUCOSE BLD GHB EST-MCNC: 123 MG/DL
HBA1C MFR BLD: 5.9 %

## 2024-08-24 LAB
1,25(OH)2D SERPL-MCNC: 54.5 PG/ML (ref 19.9–79.3)
PSA FREE MFR SERPL: 15 %
PSA FREE SERPL-MCNC: 0.3 NG/ML
PSA SERPL IA-MCNC: 2 NG/ML (ref 0–4)

## 2024-09-04 ENCOUNTER — APPOINTMENT (OUTPATIENT)
Dept: VASCULAR SURGERY | Facility: CLINIC | Age: 69
End: 2024-09-04
Payer: MEDICARE

## 2024-09-04 VITALS
WEIGHT: 206 LBS | HEIGHT: 68 IN | SYSTOLIC BLOOD PRESSURE: 120 MMHG | BODY MASS INDEX: 31.22 KG/M2 | OXYGEN SATURATION: 100 % | DIASTOLIC BLOOD PRESSURE: 72 MMHG | HEART RATE: 68 BPM

## 2024-09-04 DIAGNOSIS — I73.9 CLAUDICATION (CMS-HCC): Primary | ICD-10-CM

## 2024-09-04 PROCEDURE — 1160F RVW MEDS BY RX/DR IN RCRD: CPT | Performed by: SURGERY

## 2024-09-04 PROCEDURE — 1159F MED LIST DOCD IN RCRD: CPT | Performed by: SURGERY

## 2024-09-04 PROCEDURE — 99213 OFFICE O/P EST LOW 20 MIN: CPT | Performed by: SURGERY

## 2024-09-04 PROCEDURE — 3078F DIAST BP <80 MM HG: CPT | Performed by: SURGERY

## 2024-09-04 PROCEDURE — 3074F SYST BP LT 130 MM HG: CPT | Performed by: SURGERY

## 2024-09-04 PROCEDURE — 3008F BODY MASS INDEX DOCD: CPT | Performed by: SURGERY

## 2024-09-05 NOTE — PROGRESS NOTES
"Ariana Bill is a 69 y.o. male     Subjective   This patient presents today for follow-up of his claudication bilateral lower extremities.  He states that he asked to rest his legs for about 20 to 30 minutes before he can walk again.  He gets pain in his legs after standing for a while.  He is 5 foot 8 and weighs 206 pounds.  He does do remodeling.  He quit smoking last month.  He was smoking a few cigarettes a day.  He denies any fever chills nausea vomiting or headache         Objective     Vitals:    09/04/24 1100   BP: 120/72   Pulse: 68   SpO2: 100%      Physical Exam  This patient is alert and oriented x 3.  He is in no acute distress.  Head is normocephalic.  Pupils are equal and reactive to light and accommodation.  Neck is soft and supple without palpable lymph nodes.  Heart is regular rate.  Lungs have some decreased breath sounds posteriorly.  Abdomen is soft with positive bowel sounds there is no pain on palpation.  Upper and lower extremities seem to have adequate range of motion.  He does have palpable brachial radial femoral popliteal and pedal pulses.  Skin turgor slightly decreased in his lower legs.  Psychologically seems to be acting appropriately.  Blood pressure 120/72, pulse 68, height 1.727 m (5' 8\"), weight 93.4 kg (206 lb), SpO2 100%.            Patient Active Problem List    Diagnosis Date Noted    Coronary artery disease involving native coronary artery of native heart without angina pectoris 04/12/2024    Claudication ([facility]) 04/12/2024    Right wrist pain 04/12/2024    Lesion of right cervical nerve root 04/12/2024    Carpal tunnel syndrome of right wrist 04/12/2024    Effusion of right knee 04/11/2024    Arthritis of knee 04/11/2024    Elevated coronary artery calcium score 04/03/2024    Hypertension 01/11/2024    Hyperlipidemia 12/19/2023    GERD (gastroesophageal reflux disease) 12/19/2023    Colon polyp 12/19/2023    Smoker 12/19/2023    Subacute cough 12/19/2023    Benign " prostatic hyperplasia 12/19/2023    Vitamin D deficiency 12/19/2023    Hodgkin's disease (Multi) 02/28/2005          Current Outpatient Medications:     amLODIPine (Norvasc) 10 mg tablet, Take 1 tablet (10 mg) by mouth once daily in the morning. Take before meals., Disp: , Rfl:     aspirin 81 mg EC tablet, Take 1 tablet (81 mg) by mouth once daily., Disp: , Rfl:     atorvastatin (Lipitor) 40 mg tablet, Take 1 tablet (40 mg) by mouth once daily., Disp: 90 tablet, Rfl: 3    cholecalciferol (Vitamin D-3) 50 mcg (2,000 unit) capsule, Take 1 capsule (50 mcg) by mouth once daily., Disp: , Rfl:     cyanocobalamin (Vitamin B-12) 1,000 mcg tablet, Take 1 tablet (1,000 mcg) by mouth once daily., Disp: , Rfl:     hydroCHLOROthiazide (HYDRODiuril) 25 mg tablet, Take 1 tablet (25 mg) by mouth once daily in the morning. Take before meals., Disp: , Rfl:     losartan (Cozaar) 50 mg tablet, Take 1 tablet (50 mg) by mouth once daily., Disp: , Rfl:     naproxen (Naprosyn) 500 mg tablet, Take 1 tablet (500 mg) by mouth 2 times daily (morning and late afternoon)., Disp: , Rfl:     pantoprazole (ProtoNix) 20 mg EC tablet, Take 1 tablet (20 mg) by mouth once daily., Disp: , Rfl:      Lab Results   Component Value Date    WBC 5.5 08/21/2024    HGB 15.6 08/21/2024    HCT 45.9 08/21/2024     08/21/2024    CHOL 254 (H) 08/21/2024    TRIG 106 08/21/2024    HDL 58.2 08/21/2024    ALT 31 08/21/2024    AST 24 08/21/2024     08/21/2024    K 4.0 08/21/2024     08/21/2024    CREATININE 0.96 08/21/2024    BUN 16 08/21/2024    CO2 26 08/21/2024    TSH 0.93 08/21/2024    PSA 2.0 08/21/2024    HGBA1C 5.9 (H) 08/21/2024    HGBA1C 5.9 (H) 08/21/2024       BIANCA with exercise    Result Date: 7/11/2024                 [place] [id number] [facility], [address], [place] [address]       Tel [phone] and Fax [phone]  Vascular Lab Report [facility] ANKLE BRACHIAL INDEX (BIANCA) WITH EXERCISE  Patient Name:      ZORAN GUAJARDO     Reading Physician: [id number] Henny Faust MD Study Date:        7/10/2024          Ordering           [id number] CINDI DELGADO                                       Physician: MRN/PID:           [id number]           Technologist:      Yudi Rowe T Accession#:        [id number]       Technologist 2: Date of Birth/Age: 1955 / 69      Encounter#:        [id number]                    years Gender:            M Admission Status:  Outpatient         Location           [facility]                                       Performed:  Diagnosis/ICD: Peripheral vascular disease, unspecified-I73.9 Indication:    Peripheral vascular disease CPT Codes:     08291.52 Peripheral artery PVR with exercise Reduced Service  CONCLUSIONS: Right Lower PVR: No evidence of arterial occlusive disease in the right lower extremity at rest and with exercise. Multiphasic flow is noted in the right dorsalis pedis artery and right posterior tibial artery. Left Lower PVR: No evidence of arterial occlusive disease in the left lower extremity at rest and with exercise. Multiphasic flow is noted in the left posterior tibial artery and left dorsalis pedis artery. Exercise:Exercise completed at 1 1/2 miles per hour for 3 minutes and 30 seconds. The exam was terminated due to extremity pain.  Imaging & Doppler Findings:  RIGHT Lower PVR                Pressures Ratios Right Posterior Tibial (Ankle) 166 mmHg  1.08 Right Dorsalis Pedis (Ankle)   166 mmHg  1.08  Right Ankle Post Exercise      194 mmHg  1.18  LEFT Lower PVR                Pressures Ratios Left Posterior Tibial (Ankle) 174 mmHg  1.13 Left Dorsalis Pedis (Ankle)   173 mmHg  1.12  Left Ankle Post Exercise      192 mmHg  1.17                    Right     Left Brachial Pressure 154 mmHg 149 mmHg   [id number] Henny Faust MD Electronically signed by [id number] Henny Faust MD on 7/11/2024 at 4:08:40 PM  ** Final **     Vascular US  ankle brachial index (BIANCA) with exercise    Result Date: 7/11/2024                [facility] [address], [address], [place], [place] [address]       Tel [phone] and Fax [phone] Vascular Lab Report [facility] ANKLE BRACHIAL INDEX (BIANCA) WITH EXERCISE Patient Name:      ZORAN GUAJARDO    [place]iana Physician: [id number] Henny Faust MD Study Date:        7/10/2024          Ordering           [id number] CINDI DELGADO                                       Physician: MRN/PID:           [id number]           Technologist:      Yudi Rowe T Accession#:        [id number]       Technologist 2: Date of Birth/Age: 1955 / 69      Encounter#:        [id number]                    years Gender:            M Admission Status:  Outpatient         Location           [facility]                                       Performed: Diagnosis/ICD: Peripheral vascular disease, unspecified-I73.9 Indication:    Peripheral vascular disease CPT Codes:     54850.52 Peripheral artery PVR with exercise Reduced Service CONCLUSIONS: Right Lower PVR: No evidence of arterial occlusive disease in the right lower extremity at rest and with exercise. Multiphasic flow is noted in the right dorsalis pedis artery and right posterior tibial artery. Left Lower PVR: No evidence of arterial occlusive disease in the left lower extremity at rest and with exercise. Multiphasic flow is noted in the left posterior tibial artery and left dorsalis pedis artery. Exercise:Exercise completed at 1 1/2 miles per hour for 3 minutes and 30 seconds. The exam was terminated due to extremity pain. Imaging & Doppler Findings: RIGHT Lower PVR                Pressures Ratios Right Posterior Tibial (Ankle) 166 mmHg  1.08 Right Dorsalis Pedis (Ankle)   166 mmHg  1.08 Right Ankle Post Exercise      194 mmHg  1.18 LEFT Lower PVR                Pressures Ratios Left Posterior Tibial (Ankle) 174 mmHg  1.13  Left Dorsalis Pedis (Ankle)   173 mmHg  1.12 Left Ankle Post Exercise      192 mmHg  1.17                     Right     Left Brachial Pressure 154 mmHg 149 mmHg [id number] Henny Faust MD Electronically signed by [id number] Henny Faust MD on 7/11/2024 at 4:08:40 PM ** Final **                Assessment/Plan   Active Problems:  There are no active Hospital Problems.      This patient presents today for follow-up of his claudication of his lower extremities.  He recently did quit smoking.  He was smoking a few cigarettes a day.  He is 5 foot 8 and weighs 206 pounds.  He does get leg pain after walking certain distances.  This does take him about 20 to 30 minutes to rest his legs before he can walk again.  He does do remodeling.  On physical exam, he has palpable brachial radial femoral popliteal and pedal pulses bilaterally.  At this time, I do not feel that his claudication symptoms are secondary to arterial insufficiency.  I do feel that this is more than likely a neurological/musculoskeletal issue.  He did have ABIs in July of this year that showed normal ABIs at rest and normal ABIs after exercise.At this time, he can follow-up with me as needed since his symptoms are not associated with arterial disease      Kong Jansen, DO

## 2024-09-10 ENCOUNTER — LAB (OUTPATIENT)
Dept: LAB | Facility: LAB | Age: 69
End: 2024-09-10
Payer: MEDICARE

## 2024-09-10 DIAGNOSIS — M17.10 UNILATERAL PRIMARY OSTEOARTHRITIS, UNSPECIFIED KNEE: Primary | ICD-10-CM

## 2024-09-10 LAB — RHEUMATOID FACT SER NEPH-ACNC: <10 IU/ML (ref 0–15)

## 2024-09-10 PROCEDURE — 86038 ANTINUCLEAR ANTIBODIES: CPT

## 2024-09-10 PROCEDURE — 86431 RHEUMATOID FACTOR QUANT: CPT

## 2024-09-10 PROCEDURE — 36415 COLL VENOUS BLD VENIPUNCTURE: CPT

## 2024-09-11 LAB — ANA SER QL HEP2 SUBST: NEGATIVE

## 2024-11-08 PROBLEM — E78.5 HYPERLIPIDEMIA: Chronic | Status: ACTIVE | Noted: 2023-12-19

## 2024-12-05 ENCOUNTER — OFFICE VISIT (OUTPATIENT)
Dept: CARDIOLOGY | Facility: CLINIC | Age: 69
End: 2024-12-05
Payer: MEDICARE

## 2024-12-05 VITALS
HEART RATE: 106 BPM | SYSTOLIC BLOOD PRESSURE: 152 MMHG | BODY MASS INDEX: 31.22 KG/M2 | WEIGHT: 206 LBS | OXYGEN SATURATION: 93 % | DIASTOLIC BLOOD PRESSURE: 75 MMHG | HEIGHT: 68 IN

## 2024-12-05 DIAGNOSIS — E78.2 MIXED HYPERLIPIDEMIA: Chronic | ICD-10-CM

## 2024-12-05 DIAGNOSIS — I10 PRIMARY HYPERTENSION: Chronic | ICD-10-CM

## 2024-12-05 DIAGNOSIS — F17.200 SMOKER: ICD-10-CM

## 2024-12-05 DIAGNOSIS — I73.9 CLAUDICATION (CMS-HCC): Primary | ICD-10-CM

## 2024-12-05 DIAGNOSIS — I25.10 CORONARY ARTERY DISEASE INVOLVING NATIVE CORONARY ARTERY OF NATIVE HEART WITHOUT ANGINA PECTORIS: Chronic | ICD-10-CM

## 2024-12-05 DIAGNOSIS — R00.0 TACHYCARDIA: Chronic | ICD-10-CM

## 2024-12-05 PROBLEM — R93.1 ELEVATED CORONARY ARTERY CALCIUM SCORE: Status: RESOLVED | Noted: 2024-04-03 | Resolved: 2024-12-05

## 2024-12-05 PROBLEM — R05.2 SUBACUTE COUGH: Status: RESOLVED | Noted: 2023-12-19 | Resolved: 2024-12-05

## 2024-12-05 PROCEDURE — 93005 ELECTROCARDIOGRAM TRACING: CPT | Performed by: INTERNAL MEDICINE

## 2024-12-05 PROCEDURE — 3008F BODY MASS INDEX DOCD: CPT | Performed by: INTERNAL MEDICINE

## 2024-12-05 PROCEDURE — 1160F RVW MEDS BY RX/DR IN RCRD: CPT | Performed by: INTERNAL MEDICINE

## 2024-12-05 PROCEDURE — 99214 OFFICE O/P EST MOD 30 MIN: CPT | Performed by: INTERNAL MEDICINE

## 2024-12-05 PROCEDURE — 3077F SYST BP >= 140 MM HG: CPT | Performed by: INTERNAL MEDICINE

## 2024-12-05 PROCEDURE — 3078F DIAST BP <80 MM HG: CPT | Performed by: INTERNAL MEDICINE

## 2024-12-05 PROCEDURE — 1126F AMNT PAIN NOTED NONE PRSNT: CPT | Performed by: INTERNAL MEDICINE

## 2024-12-05 PROCEDURE — 1159F MED LIST DOCD IN RCRD: CPT | Performed by: INTERNAL MEDICINE

## 2024-12-05 RX ORDER — SIMVASTATIN 40 MG/1
40 TABLET, FILM COATED ORAL NIGHTLY
Qty: 30 TABLET | Refills: 11 | Status: SHIPPED | OUTPATIENT
Start: 2024-12-05 | End: 2025-12-05

## 2024-12-05 ASSESSMENT — PAIN SCALES - GENERAL: PAINLEVEL_OUTOF10: 0-NO PAIN

## 2024-12-05 NOTE — PROGRESS NOTES
Referred by No ref. provider found    JOHN'm seeing Ariana for a 8 month follow up. Seen by Jayshree in 6/2024, but not taking meds as advised.  He still smokes 1 to 2 cigarettes a day.  He stopped taking atorvastatin and rosuvastatin.  He tells me his blood pressures at home are in the 140 range.  His heart rate however has been high.  He does not feel any palpitations.    Past Medical History:  Problem List Items Addressed This Visit    None     Past Medical History:   Diagnosis Date    Coronary artery disease involving native coronary artery of native heart without angina pectoris 04/12/2024    Hypercholesteremia 04/12/2024    Hyperlipidemia 12/19/2023    Baseline       Hypertension 01/11/2024    Last Assessment & Plan:    Formatting of this note might be different from the original.   Will add losartan; patient will continue to monitor bp at home and let me know about results; will get an ECHO .   Patient will need CAD evaluation as well      Past Surgical History:  He has no past surgical history on file.      Social History:  He reports that he quit smoking about 4 months ago. His smoking use included cigarettes. He has never used smokeless tobacco. No history on file for alcohol use and drug use.    Family History:  Family History   Problem Relation Name Age of Onset    Heart attack Father  70     Allergies:  Patient has no known allergies.    Outpatient Medications:  Current Outpatient Medications   Medication Instructions    amLODIPine (NORVASC) 10 mg, oral, Daily before breakfast    aspirin 81 mg, oral, Daily RT    atorvastatin (LIPITOR) 40 mg, oral, Daily    cholecalciferol (VITAMIN D-3) 2,000 Units, oral, Daily RT    cyanocobalamin (VITAMIN B-12) 1,000 mcg, oral, Daily    hydroCHLOROthiazide (HYDRODIURIL) 25 mg, oral, Daily before breakfast    losartan (COZAAR) 50 mg, oral, Daily    naproxen (NAPROSYN) 500 mg, oral, 2 times daily (morning and late afternoon)    pantoprazole (PROTONIX) 20 mg, oral,  Daily     Last Recorded Vitals:  There were no vitals filed for this visit.    Physical Exam  Patient is alert and oriented x3.  HEENT is unremarkable mucous members are moist  Neck no JVP no bruits upstrokes are full no thyromegaly  Lungs are clear bilaterally.  No wheezing crackles or rales  Heart regular rhythm tachycardic normal S1-S2 there is no S3 no murmurs are heard.  Abdomen is soft bs are positive nontender nondistended no organomegaly no pulsatile masses  Extremities have no edema.  Distal pulses present palpable.  Neuro is grossly nonfocal  Skin has no rashes     Last Labs:  CBC -  Lab Results   Component Value Date    WBC 5.5 08/21/2024    HGB 15.6 08/21/2024    HCT 45.9 08/21/2024    MCV 94 08/21/2024     08/21/2024     CMP -  Lab Results   Component Value Date    CALCIUM 9.5 08/21/2024    PROT 7.0 08/21/2024    ALBUMIN 4.1 08/21/2024    AST 24 08/21/2024    ALT 31 08/21/2024    ALKPHOS 59 08/21/2024    BILITOT 1.2 08/21/2024     LIPID PANEL -   Lab Results   Component Value Date    CHOL 254 (H) 08/21/2024    HDL 58.2 08/21/2024    CHHDL 4.4 08/21/2024    VLDL 21 08/21/2024    TRIG 106 08/21/2024    NHDL 196 (H) 08/21/2024     RENAL FUNCTION PANEL -   Lab Results   Component Value Date    K 4.0 08/21/2024     Lab Results   Component Value Date    HGBA1C 5.9 (H) 08/21/2024    HGBA1C 5.9 (H) 08/21/2024        Procedures    PVRs with exercise no evidence of occlusive disease    Screening CT 3/25/2024 emphysema CAC 48 units    Echo 3/22/2024 [facility] LVH, EF 60%.    Assessment/Plan   1.  CAD.  Elevated CAC 48 units.  Continue statins continue aspirin.  No symptoms of angina.    2.  Hyperlipidemia.    8/21/2024  HDL 58 triglycerides 106 blood sugar 129.  He stopped taking atorvastatin and rosuvastatin because of intolerance.  I will put him on simvastatin 40 mg.  Labs to be followed by his primary care doctor in 3 to 4 months    3.  Claudication.  He does not have exertional claudication but rather  has muscle aches and pains when he is on statins.  He continues to have the same symptoms despite being off of all statins.  Exercise PVRs July 2024 normal no evidence of reduced flow.    4. HTN-blood pressures are elevated here but better at home.  Sometimes in the evening to drop below.  Continue with losartan 50 mg, amlodipine 10 mg, and hydrochlorothiazide.  If needed a beta-blocker to be added.    5.  Tachycardia.  Will check an EKG today.  He tells me recently his heart rates have been elevated.    EKG today.  Start simvastatin.  Return to see me 4 to 6 months  Madie Liang MD     Instructions and follow up

## 2024-12-13 LAB
ATRIAL RATE: 99 BPM
P AXIS: 59 DEGREES
P OFFSET: 189 MS
P ONSET: 131 MS
PR INTERVAL: 162 MS
Q ONSET: 212 MS
QRS COUNT: 16 BEATS
QRS DURATION: 78 MS
QT INTERVAL: 350 MS
QTC CALCULATION(BAZETT): 449 MS
QTC FREDERICIA: 413 MS
R AXIS: 21 DEGREES
T AXIS: 25 DEGREES
T OFFSET: 387 MS
VENTRICULAR RATE: 99 BPM

## 2025-01-27 ENCOUNTER — APPOINTMENT (OUTPATIENT)
Dept: DERMATOLOGY | Facility: CLINIC | Age: 70
End: 2025-01-27
Payer: MEDICARE

## 2025-06-12 ENCOUNTER — APPOINTMENT (OUTPATIENT)
Dept: CARDIOLOGY | Facility: CLINIC | Age: 70
End: 2025-06-12
Payer: MEDICARE

## 2025-07-17 ENCOUNTER — OFFICE VISIT (OUTPATIENT)
Dept: CARDIOLOGY | Facility: CLINIC | Age: 70
End: 2025-07-17
Payer: MEDICARE

## 2025-07-17 VITALS
BODY MASS INDEX: 30.4 KG/M2 | OXYGEN SATURATION: 94 % | SYSTOLIC BLOOD PRESSURE: 163 MMHG | HEART RATE: 87 BPM | HEIGHT: 68 IN | WEIGHT: 200.6 LBS | DIASTOLIC BLOOD PRESSURE: 94 MMHG

## 2025-07-17 DIAGNOSIS — I25.10 CORONARY ARTERY DISEASE INVOLVING NATIVE CORONARY ARTERY OF NATIVE HEART WITHOUT ANGINA PECTORIS: Primary | Chronic | ICD-10-CM

## 2025-07-17 DIAGNOSIS — R00.0 TACHYCARDIA: Chronic | ICD-10-CM

## 2025-07-17 DIAGNOSIS — I10 PRIMARY HYPERTENSION: Chronic | ICD-10-CM

## 2025-07-17 DIAGNOSIS — E78.2 MIXED HYPERLIPIDEMIA: Chronic | ICD-10-CM

## 2025-07-17 PROBLEM — M17.10 ARTHRITIS OF KNEE: Status: RESOLVED | Noted: 2024-04-11 | Resolved: 2025-07-17

## 2025-07-17 PROBLEM — M25.531 RIGHT WRIST PAIN: Status: RESOLVED | Noted: 2024-04-12 | Resolved: 2025-07-17

## 2025-07-17 PROBLEM — M25.461 EFFUSION OF RIGHT KNEE: Status: RESOLVED | Noted: 2024-04-11 | Resolved: 2025-07-17

## 2025-07-17 PROCEDURE — 99213 OFFICE O/P EST LOW 20 MIN: CPT | Performed by: INTERNAL MEDICINE

## 2025-07-17 PROCEDURE — 3008F BODY MASS INDEX DOCD: CPT | Performed by: INTERNAL MEDICINE

## 2025-07-17 PROCEDURE — 3080F DIAST BP >= 90 MM HG: CPT | Performed by: INTERNAL MEDICINE

## 2025-07-17 PROCEDURE — 1159F MED LIST DOCD IN RCRD: CPT | Performed by: INTERNAL MEDICINE

## 2025-07-17 PROCEDURE — 1160F RVW MEDS BY RX/DR IN RCRD: CPT | Performed by: INTERNAL MEDICINE

## 2025-07-17 PROCEDURE — 99212 OFFICE O/P EST SF 10 MIN: CPT

## 2025-07-17 PROCEDURE — 3077F SYST BP >= 140 MM HG: CPT | Performed by: INTERNAL MEDICINE

## 2025-07-17 RX ORDER — GLUCOSAMINE/CHONDRO SU A 500-400 MG
1 TABLET ORAL DAILY
COMMUNITY

## 2025-07-17 RX ORDER — AMLODIPINE BESYLATE 10 MG/1
10 TABLET ORAL
Qty: 90 TABLET | Refills: 3 | Status: SHIPPED | OUTPATIENT
Start: 2025-07-17 | End: 2026-07-17

## 2025-07-17 RX ORDER — BACITRACIN 500 UNIT/G
OINTMENT (GRAM) TOPICAL
COMMUNITY

## 2025-07-17 RX ORDER — LANOLIN ALCOHOL/MO/W.PET/CERES
50 CREAM (GRAM) TOPICAL DAILY
COMMUNITY

## 2025-07-17 RX ORDER — LOSARTAN POTASSIUM 50 MG/1
50 TABLET ORAL DAILY
Qty: 90 TABLET | Refills: 3 | Status: SHIPPED | OUTPATIENT
Start: 2025-07-17 | End: 2026-07-17

## 2025-07-17 NOTE — PATIENT INSTRUCTIONS
1.  CAD.  Elevated CAC 48 units.  Continue statins continue aspirin.  No symptoms of angina.    2.  Hyperlipidemia.    8/21/2024  HDL 58 triglycerides 106 blood sugar 129.  He stopped taking atorvastatin and rosuvastatin because of intolerance.  I will put him on simvastatin 40 mg.  Labs to be followed by his primary care doctor in 3 to 4 months    3.  Claudication.  He does not have exertional claudication but rather has muscle aches and pains when he is on statins.  He continues to have the same symptoms despite being off of all statins.  Exercise PVRs July 2024 normal no evidence of reduced flow.    4. HTN blood pressures are quite high here.  He tells me at home it is always in the 120s.  He takes them daily.  He does not have hydrochlorothiazide on his list.  If his blood pressures go up this can be added.      RTC 1 year

## 2025-07-17 NOTE — PROGRESS NOTES
Referred by Garth LUCAS     I am seeing  Ariana for 7-month follow-up.  Overall feeling well.  Still smoking 1 to 2 cigarettes a day.  Not taking simvastatin because he developed muscle aches and pains again.  Blood pressures at home which he takes every day are in the 120 range.  He is not on hydrochlorothiazide based on his list.  No chest pain or pressure    Past Medical History:  Problem List Items Addressed This Visit    None     Past Medical History:   Diagnosis Date    Coronary artery disease involving native coronary artery of native heart without angina pectoris 04/12/2024    Hypercholesteremia 04/12/2024    Hyperlipidemia 12/19/2023    Baseline       Hypertension 01/11/2024    Last Assessment & Plan:    Formatting of this note might be different from the original.   Will add losartan; patient will continue to monitor bp at home and let me know about results; will get an ECHO .   Patient will need CAD evaluation as well    Tachycardia 12/05/2024      Past Surgical History:  He has no past surgical history on file.      Social History:  He reports that he has been smoking cigarettes. He has never used smokeless tobacco. No history on file for alcohol use and drug use.    Family History:  Family History   Problem Relation Name Age of Onset    Heart attack Father  70     Allergies:  Patient has no known allergies.    Outpatient Medications:  Current Outpatient Medications   Medication Instructions    amLODIPine (NORVASC) 10 mg, Daily before breakfast    aspirin 81 mg, Daily RT    cholecalciferol (VITAMIN D-3) 2,000 Units, Daily RT    cyanocobalamin (VITAMIN B-12) 1,000 mcg, Daily    hydroCHLOROthiazide (HYDRODIURIL) 25 mg, Daily before breakfast    losartan (COZAAR) 50 mg, Daily    pantoprazole (PROTONIX) 20 mg, Daily    simvastatin (ZOCOR) 40 mg, oral, Nightly     Last Recorded Vitals:  There were no vitals filed for this visit.    Physical Exam  Patient is alert and oriented x3.  HEENT is  unremarkable mucous members are moist  Neck no JVP no bruits upstrokes are full no thyromegaly  Lungs are clear bilaterally.  No wheezing crackles or rales  Heart regular rhythm tachycardic normal S1-S2 there is no S3 no murmurs are heard.  Abdomen is soft bs are positive nontender nondistended no organomegaly no pulsatile masses  Extremities have no edema.  Distal pulses present palpable.  Neuro is grossly nonfocal  Skin has no rashes     Last Labs:  CBC -  Lab Results   Component Value Date    WBC 5.5 08/21/2024    HGB 15.6 08/21/2024    HCT 45.9 08/21/2024    MCV 94 08/21/2024     08/21/2024     CMP -  Lab Results   Component Value Date    CALCIUM 9.5 08/21/2024    PROT 7.0 08/21/2024    ALBUMIN 4.1 08/21/2024    AST 24 08/21/2024    ALT 31 08/21/2024    ALKPHOS 59 08/21/2024    BILITOT 1.2 08/21/2024     LIPID PANEL -   Lab Results   Component Value Date    CHOL 254 (H) 08/21/2024    HDL 58.2 08/21/2024    CHHDL 4.4 08/21/2024    VLDL 21 08/21/2024    TRIG 106 08/21/2024    NHDL 196 (H) 08/21/2024     RENAL FUNCTION PANEL -   Lab Results   Component Value Date    K 4.0 08/21/2024     Lab Results   Component Value Date    HGBA1C 5.9 (H) 08/21/2024    HGBA1C 5.9 (H) 08/21/2024        Procedures    PVRs with exercise no evidence of occlusive disease    Screening CT 3/25/2024 emphysema CAC 48 units    Echo 3/22/2024 [facility] LVH, EF 60%.    Assessment/Plan   1.  CAD.  Elevated CAC 48 units.  Continue statins continue aspirin.  No symptoms of angina.    2.  Hyperlipidemia.    8/21/2024  HDL 58 triglycerides 106 blood sugar 129.  He stopped taking atorvastatin and rosuvastatin because of intolerance.  I will put him on simvastatin 40 mg.  Labs to be followed by his primary care doctor in 3 to 4 months    3.  Claudication.  He does not have exertional claudication but rather has muscle aches and pains when he is on statins.  He continues to have the same symptoms despite being off of all statins.  Exercise  PVRs July 2024 normal no evidence of reduced flow.    4. HTN blood pressures are quite high here.  He tells me at home it is always in the 120s.  He takes them daily.  He does not have hydrochlorothiazide on his list.  If his blood pressures go up this can be added.      RTC 1 year  Madie Liang MD     Instructions and follow up